# Patient Record
Sex: MALE | Race: WHITE
[De-identification: names, ages, dates, MRNs, and addresses within clinical notes are randomized per-mention and may not be internally consistent; named-entity substitution may affect disease eponyms.]

---

## 2021-10-07 ENCOUNTER — HOSPITAL ENCOUNTER (EMERGENCY)
Dept: HOSPITAL 26 - MED | Age: 51
Discharge: HOME | End: 2021-10-07
Payer: MEDICAID

## 2021-10-07 VITALS — SYSTOLIC BLOOD PRESSURE: 118 MMHG | DIASTOLIC BLOOD PRESSURE: 42 MMHG

## 2021-10-07 VITALS — BODY MASS INDEX: 25.76 KG/M2 | HEIGHT: 68 IN | WEIGHT: 170 LBS
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
## 2021-10-07 DIAGNOSIS — S93.402A: ICD-10-CM
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
## 2021-10-07 DIAGNOSIS — S86.022A: Primary | ICD-10-CM

## 2021-10-07 DIAGNOSIS — Z79.899: ICD-10-CM

## 2021-10-07 DIAGNOSIS — Z98.890: ICD-10-CM

## 2021-10-07 NOTE — NUR
PT PLACED IN RIGHT POSTERIOR SHORT LEG SPLINT WRAPPED WITH 3" ACE WRAPS X3, CMS 
WNL BEFORE AND AFTER.

## 2021-10-07 NOTE — NUR
Patient discharged with v/s stable. Written and verbal after care instructions 
ABOUT ANKLE SPRAIN AND ACHILLES TENDON TEAR given and explained. 

Patient alert, oriented and verbalized understanding of instructions. 
Ambulatory WITH SCOOTER to car. All questions addressed prior to discharge. ID 
band removed. Patient advised to follow up with PMD. Rx of  given. Patient 
educated on indication of medication including possible reaction and side 
effects. Opportunity to ask questions provided and answered.